# Patient Record
Sex: FEMALE | Race: BLACK OR AFRICAN AMERICAN | NOT HISPANIC OR LATINO | Employment: FULL TIME | URBAN - METROPOLITAN AREA
[De-identification: names, ages, dates, MRNs, and addresses within clinical notes are randomized per-mention and may not be internally consistent; named-entity substitution may affect disease eponyms.]

---

## 2023-09-03 LAB — PRENATAL STREP B CULTURE: NEGATIVE

## 2023-09-15 DIAGNOSIS — O48.0 POST-DATES PREGNANCY: Primary | ICD-10-CM

## 2023-09-20 ENCOUNTER — HOSPITAL ENCOUNTER (INPATIENT)
Facility: HOSPITAL | Age: 33
LOS: 3 days | Discharge: HOME | End: 2023-09-23
Attending: OBSTETRICS & GYNECOLOGY | Admitting: OBSTETRICS & GYNECOLOGY
Payer: COMMERCIAL

## 2023-09-20 ENCOUNTER — ANESTHESIA EVENT (OUTPATIENT)
Dept: OBSTETRICS AND GYNECOLOGY | Facility: HOSPITAL | Age: 33
End: 2023-09-20
Payer: COMMERCIAL

## 2023-09-20 ENCOUNTER — ANESTHESIA (OUTPATIENT)
Dept: OBSTETRICS AND GYNECOLOGY | Facility: HOSPITAL | Age: 33
End: 2023-09-20
Payer: COMMERCIAL

## 2023-09-20 VITALS — RESPIRATION RATE: 10 BRPM

## 2023-09-20 DIAGNOSIS — Z37.9 NORMAL LABOR: ICD-10-CM

## 2023-09-20 DIAGNOSIS — O61.9 FAILED INDUCTION OF LABOR: ICD-10-CM

## 2023-09-20 DIAGNOSIS — Z98.891 S/P CESAREAN SECTION: Primary | ICD-10-CM

## 2023-09-20 LAB
ALBUMIN SERPL-MCNC: 2.9 G/DL (ref 3.5–5)
ALBUMIN/GLOB SERPL: 0.9 RATIO (ref 1.1–2)
ALP SERPL-CCNC: 123 UNIT/L (ref 40–150)
ALT SERPL-CCNC: 11 UNIT/L (ref 0–55)
AST SERPL-CCNC: 16 UNIT/L (ref 5–34)
BASOPHILS # BLD AUTO: 0.04 X10(3)/MCL
BASOPHILS NFR BLD AUTO: 0.3 %
BILIRUB SERPL-MCNC: 0.8 MG/DL
BUN SERPL-MCNC: 5.1 MG/DL (ref 7–18.7)
CALCIUM SERPL-MCNC: 8.4 MG/DL (ref 8.4–10.2)
CHLORIDE SERPL-SCNC: 108 MMOL/L (ref 98–107)
CO2 SERPL-SCNC: 17 MMOL/L (ref 22–29)
CREAT SERPL-MCNC: 0.68 MG/DL (ref 0.55–1.02)
CTP QC/QA: YES
EOSINOPHIL # BLD AUTO: 0 X10(3)/MCL (ref 0–0.9)
EOSINOPHIL NFR BLD AUTO: 0 %
ERYTHROCYTE [DISTWIDTH] IN BLOOD BY AUTOMATED COUNT: 13 % (ref 11.5–17)
GFR SERPLBLD CREATININE-BSD FMLA CKD-EPI: >60 MLS/MIN/1.73/M2
GLOBULIN SER-MCNC: 3.4 GM/DL (ref 2.4–3.5)
GLUCOSE SERPL-MCNC: 82 MG/DL (ref 74–100)
GROUP & RH: NORMAL
HBV SURFACE AG SERPL QL IA: NONREACTIVE
HCT VFR BLD AUTO: 41 % (ref 37–47)
HGB BLD-MCNC: 14 G/DL (ref 12–16)
HIV 1+2 AB+HIV1 P24 AG SERPL QL IA: NONREACTIVE
IMM GRANULOCYTES # BLD AUTO: 0.06 X10(3)/MCL (ref 0–0.04)
IMM GRANULOCYTES NFR BLD AUTO: 0.4 %
INDIRECT COOMBS GEL: NORMAL
LYMPHOCYTES # BLD AUTO: 1.14 X10(3)/MCL (ref 0.6–4.6)
LYMPHOCYTES NFR BLD AUTO: 8.3 %
MCH RBC QN AUTO: 32.6 PG (ref 27–31)
MCHC RBC AUTO-ENTMCNC: 34.1 G/DL (ref 33–36)
MCV RBC AUTO: 95.6 FL (ref 80–94)
MONOCYTES # BLD AUTO: 0.54 X10(3)/MCL (ref 0.1–1.3)
MONOCYTES NFR BLD AUTO: 3.9 %
NEUTROPHILS # BLD AUTO: 12.01 X10(3)/MCL (ref 2.1–9.2)
NEUTROPHILS NFR BLD AUTO: 87.1 %
NRBC BLD AUTO-RTO: 0 %
PLATELET # BLD AUTO: 257 X10(3)/MCL (ref 130–400)
PMV BLD AUTO: 9.4 FL (ref 7.4–10.4)
POTASSIUM SERPL-SCNC: 3.6 MMOL/L (ref 3.5–5.1)
PROT SERPL-MCNC: 6.3 GM/DL (ref 6.4–8.3)
RBC # BLD AUTO: 4.29 X10(6)/MCL (ref 4.2–5.4)
RUPTURE OF MEMBRANE: POSITIVE
SODIUM SERPL-SCNC: 138 MMOL/L (ref 136–145)
SPECIMEN OUTDATE: NORMAL
T PALLIDUM AB SER QL: NONREACTIVE
WBC # SPEC AUTO: 13.79 X10(3)/MCL (ref 4.5–11.5)

## 2023-09-20 PROCEDURE — 85025 COMPLETE CBC W/AUTO DIFF WBC: CPT | Performed by: OBSTETRICS & GYNECOLOGY

## 2023-09-20 PROCEDURE — 25000003 PHARM REV CODE 250: Performed by: ANESTHESIOLOGY

## 2023-09-20 PROCEDURE — 87340 HEPATITIS B SURFACE AG IA: CPT | Performed by: OBSTETRICS & GYNECOLOGY

## 2023-09-20 PROCEDURE — 86780 TREPONEMA PALLIDUM: CPT | Performed by: OBSTETRICS & GYNECOLOGY

## 2023-09-20 PROCEDURE — 59514 PRA REAN DELIVERY ONLY: ICD-10-PCS | Mod: QX,CRNA,, | Performed by: NURSE ANESTHETIST, CERTIFIED REGISTERED

## 2023-09-20 PROCEDURE — 62326 NJX INTERLAMINAR LMBR/SAC: CPT | Performed by: ANESTHESIOLOGY

## 2023-09-20 PROCEDURE — 01968 ANES/ANALG CS DLVR NEURAXIAL: CPT | Mod: QX,CRNA,, | Performed by: NURSE ANESTHETIST, CERTIFIED REGISTERED

## 2023-09-20 PROCEDURE — 80053 COMPREHEN METABOLIC PANEL: CPT | Performed by: OBSTETRICS & GYNECOLOGY

## 2023-09-20 PROCEDURE — 01968 ANES/ANALG CS DLVR NEURAXIAL: CPT | Mod: QY,ANES,, | Performed by: ANESTHESIOLOGY

## 2023-09-20 PROCEDURE — 01968 PR INSERT CATH,ART,PERCUT,SHORTTERM: ICD-10-PCS | Mod: QY,ANES,, | Performed by: ANESTHESIOLOGY

## 2023-09-20 PROCEDURE — 86901 BLOOD TYPING SEROLOGIC RH(D): CPT | Performed by: OBSTETRICS & GYNECOLOGY

## 2023-09-20 PROCEDURE — 25000003 PHARM REV CODE 250

## 2023-09-20 PROCEDURE — 63600175 PHARM REV CODE 636 W HCPCS: Performed by: OBSTETRICS & GYNECOLOGY

## 2023-09-20 PROCEDURE — 01968 PR INSERT CATH,ART,PERCUT,SHORTTERM: ICD-10-PCS | Mod: QX,CRNA,, | Performed by: NURSE ANESTHETIST, CERTIFIED REGISTERED

## 2023-09-20 PROCEDURE — 87389 HIV-1 AG W/HIV-1&-2 AB AG IA: CPT | Performed by: OBSTETRICS & GYNECOLOGY

## 2023-09-20 PROCEDURE — 51702 INSERT TEMP BLADDER CATH: CPT

## 2023-09-20 PROCEDURE — 11000001 HC ACUTE MED/SURG PRIVATE ROOM

## 2023-09-20 PROCEDURE — 63600175 PHARM REV CODE 636 W HCPCS: Performed by: ANESTHESIOLOGY

## 2023-09-20 PROCEDURE — 25000003 PHARM REV CODE 250: Performed by: OBSTETRICS & GYNECOLOGY

## 2023-09-20 PROCEDURE — 59514 PRA REAN DELIVERY ONLY: ICD-10-PCS | Mod: AA,ANES,, | Performed by: ANESTHESIOLOGY

## 2023-09-20 PROCEDURE — 59514 CESAREAN DELIVERY ONLY: CPT | Mod: QX,CRNA,, | Performed by: NURSE ANESTHETIST, CERTIFIED REGISTERED

## 2023-09-20 PROCEDURE — 99285 EMERGENCY DEPT VISIT HI MDM: CPT

## 2023-09-20 PROCEDURE — 59514 CESAREAN DELIVERY ONLY: CPT | Mod: AA,ANES,, | Performed by: ANESTHESIOLOGY

## 2023-09-20 RX ORDER — OXYTOCIN/RINGER'S LACTATE 30/500 ML
334 PLASTIC BAG, INJECTION (ML) INTRAVENOUS ONCE
Status: DISCONTINUED | OUTPATIENT
Start: 2023-09-20 | End: 2023-09-21

## 2023-09-20 RX ORDER — CALCIUM CARBONATE 200(500)MG
500 TABLET,CHEWABLE ORAL 3 TIMES DAILY PRN
Status: DISCONTINUED | OUTPATIENT
Start: 2023-09-20 | End: 2023-09-21

## 2023-09-20 RX ORDER — GENTAMICIN SULFATE 80 MG/50ML
80 INJECTION, SOLUTION INTRAVENOUS
Status: DISCONTINUED | OUTPATIENT
Start: 2023-09-20 | End: 2023-09-20

## 2023-09-20 RX ORDER — OXYTOCIN/RINGER'S LACTATE 30/500 ML
0-30 PLASTIC BAG, INJECTION (ML) INTRAVENOUS CONTINUOUS
Status: DISCONTINUED | OUTPATIENT
Start: 2023-09-20 | End: 2023-09-21

## 2023-09-20 RX ORDER — OXYTOCIN/RINGER'S LACTATE 30/500 ML
95 PLASTIC BAG, INJECTION (ML) INTRAVENOUS ONCE AS NEEDED
Status: COMPLETED | OUTPATIENT
Start: 2023-09-20 | End: 2023-09-21

## 2023-09-20 RX ORDER — EPHEDRINE SULFATE 50 MG/ML
INJECTION, SOLUTION INTRAVENOUS
Status: COMPLETED
Start: 2023-09-20 | End: 2023-09-20

## 2023-09-20 RX ORDER — OXYTOCIN 10 [USP'U]/ML
10 INJECTION, SOLUTION INTRAMUSCULAR; INTRAVENOUS ONCE AS NEEDED
Status: DISCONTINUED | OUTPATIENT
Start: 2023-09-20 | End: 2023-09-21

## 2023-09-20 RX ORDER — GENTAMICIN SULFATE 80 MG/50ML
80 INJECTION, SOLUTION INTRAVENOUS
Status: DISCONTINUED | OUTPATIENT
Start: 2023-09-20 | End: 2023-09-21

## 2023-09-20 RX ORDER — LIDOCAINE HYDROCHLORIDE 10 MG/ML
10 INJECTION INFILTRATION; PERINEURAL ONCE AS NEEDED
Status: DISCONTINUED | OUTPATIENT
Start: 2023-09-20 | End: 2023-09-21

## 2023-09-20 RX ORDER — ONDANSETRON 4 MG/1
8 TABLET, ORALLY DISINTEGRATING ORAL EVERY 8 HOURS PRN
Status: DISCONTINUED | OUTPATIENT
Start: 2023-09-20 | End: 2023-09-21

## 2023-09-20 RX ORDER — PROCHLORPERAZINE EDISYLATE 5 MG/ML
5 INJECTION INTRAMUSCULAR; INTRAVENOUS EVERY 6 HOURS PRN
Status: DISCONTINUED | OUTPATIENT
Start: 2023-09-20 | End: 2023-09-21

## 2023-09-20 RX ORDER — OXYTOCIN/RINGER'S LACTATE 30/500 ML
334 PLASTIC BAG, INJECTION (ML) INTRAVENOUS ONCE AS NEEDED
Status: DISCONTINUED | OUTPATIENT
Start: 2023-09-20 | End: 2023-09-21

## 2023-09-20 RX ORDER — METHYLERGONOVINE MALEATE 0.2 MG/ML
200 INJECTION INTRAVENOUS
Status: DISCONTINUED | OUTPATIENT
Start: 2023-09-20 | End: 2023-09-21

## 2023-09-20 RX ORDER — SODIUM CHLORIDE, SODIUM LACTATE, POTASSIUM CHLORIDE, CALCIUM CHLORIDE 600; 310; 30; 20 MG/100ML; MG/100ML; MG/100ML; MG/100ML
INJECTION, SOLUTION INTRAVENOUS CONTINUOUS
Status: DISCONTINUED | OUTPATIENT
Start: 2023-09-20 | End: 2023-09-21

## 2023-09-20 RX ORDER — FENTANYL/BUPIVACAINE/NS/PF 2-1250MCG
PLASTIC BAG, INJECTION (ML) INJECTION CONTINUOUS
Status: DISCONTINUED | OUTPATIENT
Start: 2023-09-20 | End: 2023-09-21

## 2023-09-20 RX ORDER — OXYTOCIN/RINGER'S LACTATE 30/500 ML
95 PLASTIC BAG, INJECTION (ML) INTRAVENOUS ONCE
Status: DISCONTINUED | OUTPATIENT
Start: 2023-09-20 | End: 2023-09-21

## 2023-09-20 RX ORDER — FAMOTIDINE 10 MG/ML
20 INJECTION INTRAVENOUS ONCE
Status: DISCONTINUED | OUTPATIENT
Start: 2023-09-20 | End: 2023-09-21

## 2023-09-20 RX ORDER — SODIUM CHLORIDE 9 MG/ML
INJECTION, SOLUTION INTRAVENOUS
Status: DISCONTINUED | OUTPATIENT
Start: 2023-09-20 | End: 2023-09-21

## 2023-09-20 RX ORDER — EPHEDRINE SULFATE 50 MG/ML
10 INJECTION, SOLUTION INTRAVENOUS ONCE AS NEEDED
Status: COMPLETED | OUTPATIENT
Start: 2023-09-20 | End: 2023-09-20

## 2023-09-20 RX ORDER — MISOPROSTOL 100 UG/1
800 TABLET ORAL ONCE AS NEEDED
Status: DISCONTINUED | OUTPATIENT
Start: 2023-09-20 | End: 2023-09-21

## 2023-09-20 RX ORDER — SIMETHICONE 80 MG
1 TABLET,CHEWABLE ORAL 4 TIMES DAILY PRN
Status: DISCONTINUED | OUTPATIENT
Start: 2023-09-20 | End: 2023-09-21

## 2023-09-20 RX ORDER — BUPIVACAINE HYDROCHLORIDE 2.5 MG/ML
INJECTION, SOLUTION EPIDURAL; INFILTRATION; INTRACAUDAL
Status: COMPLETED | OUTPATIENT
Start: 2023-09-20 | End: 2023-09-20

## 2023-09-20 RX ORDER — CARBOPROST TROMETHAMINE 250 UG/ML
250 INJECTION, SOLUTION INTRAMUSCULAR
Status: DISCONTINUED | OUTPATIENT
Start: 2023-09-20 | End: 2023-09-21

## 2023-09-20 RX ORDER — SODIUM CITRATE AND CITRIC ACID MONOHYDRATE 334; 500 MG/5ML; MG/5ML
30 SOLUTION ORAL ONCE
Status: COMPLETED | OUTPATIENT
Start: 2023-09-20 | End: 2023-09-20

## 2023-09-20 RX ORDER — DIPHENOXYLATE HYDROCHLORIDE AND ATROPINE SULFATE 2.5; .025 MG/1; MG/1
2 TABLET ORAL EVERY 6 HOURS PRN
Status: DISCONTINUED | OUTPATIENT
Start: 2023-09-20 | End: 2023-09-21

## 2023-09-20 RX ADMIN — SODIUM CITRATE AND CITRIC ACID MONOHYDRATE 30 ML: 500; 334 SOLUTION ORAL at 04:09

## 2023-09-20 RX ADMIN — Medication 10 ML/HR: at 04:09

## 2023-09-20 RX ADMIN — AMPICILLIN SODIUM 2 G: 2 INJECTION, POWDER, FOR SOLUTION INTRAMUSCULAR; INTRAVENOUS at 07:09

## 2023-09-20 RX ADMIN — EPHEDRINE SULFATE: 50 INJECTION INTRAVENOUS at 06:09

## 2023-09-20 RX ADMIN — Medication 2 MILLI-UNITS/MIN: at 08:09

## 2023-09-20 RX ADMIN — EPHEDRINE SULFATE 10 MG: 50 INJECTION INTRAVENOUS at 05:09

## 2023-09-20 RX ADMIN — SODIUM CHLORIDE, POTASSIUM CHLORIDE, SODIUM LACTATE AND CALCIUM CHLORIDE 500 ML: 600; 310; 30; 20 INJECTION, SOLUTION INTRAVENOUS at 11:09

## 2023-09-20 RX ADMIN — Medication 10 ML/HR: at 05:09

## 2023-09-20 RX ADMIN — SODIUM CHLORIDE, POTASSIUM CHLORIDE, SODIUM LACTATE AND CALCIUM CHLORIDE: 600; 310; 30; 20 INJECTION, SOLUTION INTRAVENOUS at 09:09

## 2023-09-20 RX ADMIN — GENTAMICIN SULFATE 80 MG: 80 INJECTION, SOLUTION INTRAVENOUS at 09:09

## 2023-09-20 RX ADMIN — EPHEDRINE SULFATE 10 MG: 50 INJECTION, SOLUTION INTRAVENOUS at 05:09

## 2023-09-20 RX ADMIN — SODIUM CHLORIDE, POTASSIUM CHLORIDE, SODIUM LACTATE AND CALCIUM CHLORIDE: 600; 310; 30; 20 INJECTION, SOLUTION INTRAVENOUS at 06:09

## 2023-09-20 RX ADMIN — BUPIVACAINE HYDROCHLORIDE 8 ML: 2.5 INJECTION, SOLUTION EPIDURAL; INFILTRATION; INTRACAUDAL; PERINEURAL at 04:09

## 2023-09-20 NOTE — H&P
"LnD HnP     Admit Date: 2023  RAJANI Physician: Anson Chua  Primary OBGYN: No Attending/OB Hospitalist Group Admit    Admit Diagnosis/Chief Complaint: Leakage of Fluid and Contractions      Chief Complaint   Patient presents with    Abdominal Pain       Hospital Course:  Jade Berg is a 33 y.o.  at 40w3d presents from the birth house she is been laboring and ruptured since 1315 yesterday.  States good fetal movement  This IUP is complicated by denies any complication no medication outside of prenatal vitamins denies history of or recent STI denies lesions or prodrome of symptoms.    Patient denies vaginal bleeding.  Fetal Movement: normal.    Temp 99 °F (37.2 °C) (Oral)   Resp 20   Ht 5' 3" (1.6 m)   Wt 72.6 kg (160 lb)   BMI 28.34 kg/m²   Temp:  [99 °F (37.2 °C)] 99 °F (37.2 °C)  Resp:  [20] 20    General: in no apparent distress  Abdominal: soft, nontender, nondistended, no abnormal masses, no epigastric pain FHT present  Back: lumbar tenderness absent   CVA tenderness none  Extremeties no redness or tenderness in the calves or thighs no edema    SSE:   SVE:SVE (PeriWATCH)  Dilation (cm): 4  Effacement (%): 80  Station: -2  Examined by:: THEO Agustin RN  Simplified Harris Score: 6     FHT:  Reactive  TOCO: Contractions every 5-8 min      LABS:     Recent Results (from the past 24 hour(s))   POCT Rupture of membrane    Collection Time: 23  3:05 PM   Result Value Ref Range    Rupture of Membrane Positive (A) Negative     Acceptable Yes      [unfilled]     Imaging Results    None          ASSESMENT: Jade Berg is a 33 y.o.   at 40w3d regina in labor  Admit to Labor and delivery   IV fluids   No signs of endometritis CBC pending  Suspected meconium on ROM plus examination discussed difficulties with delayed cord clamping in the setting of meconium  Patient was given opportunity to ask questions  Epidural as needed   Admission labs   Discussed " with private physician or on-call physician      Discharge Diagnosis/Clinical Impression**: Normal labor   Status:Stable                This note was created with the assistance of ST[employer] voice recognition software. There may be transcription errors as a result of using this technology however minimal. Effort has been made to assure accuracy of transcription but any obvious errors or omissions should be clarified with the author of the document.

## 2023-09-20 NOTE — ED TRIAGE NOTES
IUP 40.3 pt presents from [facility]. Pt states she has jacki regina since last evening. Pt reported to [facility] this morning around 0900. Midwife is present and states the pt is 4cm dilated. Pt reports leakings jackelin 1315 yesterday afternoon. Pt reports good fetal movement.

## 2023-09-20 NOTE — ANESTHESIA PROCEDURE NOTES
Epidural    Patient location during procedure: OB   Reason for block: primary anesthetic   Reason for block: at surgeon's request, labor analgesia requested by patient and obstetrician   Start time: 9/20/2023 4:41 PM  Timeout: 9/20/2023 4:41 PM  End time: 9/20/2023 4:59 PM    Staffing  Performing Provider: Cachorro Herbert DO  Authorizing Provider: Cachorro Herbert DO    Staffing  Performed by: Cachorro Herbert DO  Authorized by: Cachorro Herbert DO        Preanesthetic Checklist  Completed: patient identified, IV checked, site marked, risks and benefits discussed, surgical consent, monitors and equipment checked, pre-op evaluation, timeout performed, anesthesia consent given, hand hygiene performed and patient being monitored  Preparation  Patient position: sitting  Prep: ChloraPrep  Patient monitoring: ECG, Pulse Ox and Blood Pressure  Reason for block: primary anesthetic   Epidural  Skin Anesthetic: lidocaine 1%  Skin Wheal: 1 mL  Administration type: single shot  Approach: midline  Interspace: L3-4    Injection technique: MENDEL air and MENDEL saline  Needle and Epidural Catheter  Needle type: Tuohy   Needle gauge: 17  Needle length: 3.5 inches  Catheter type: multi-orifice  Catheter size: 20 G  Insertion Attempts: 1  Test dose: 5 mL of lidocaine 1.5% with Epi 1-to-200,000  Additional Documentation: incremental injection, no paresthesia on injection, no significant pain on injection, negative aspiration for heme and CSF, no signs/symptoms of IV or SA injection and no significant complaints from patient  Needle localization: anatomical landmarks  Assessment  Ease of block: moderate  Patient's tolerance of the procedure: comfortable throughout block and no complaints No inadvertent dural puncture with Tuohy.  Dural puncture not performed with spinal needle    Medications:    Medications: bupivacaine (pf) (MARCAINE) injection 0.25% - Epidural   8 mL - 9/20/2023 4:58:00 PM

## 2023-09-20 NOTE — ANESTHESIA PREPROCEDURE EVALUATION
09/20/2023  Jade Berg is a 33 y.o., female.      Pre-op Assessment    I have reviewed the Patient Summary Reports.     I have reviewed the Nursing Notes. I have reviewed the NPO Status.   I have reviewed the Medications.     Review of Systems  Anesthesia Hx:  No problems with previous Anesthesia    Social:  Non-Smoker    Cardiovascular:   Denies Hypertension.  Denies Valvular problems/Murmurs.     Pulmonary:   Denies COPD.  Denies Asthma.    Renal/:   Denies Chronic Renal Disease.     Hepatic/GI:   Denies GERD. Denies Liver Disease.  Denies Hepatitis.    OB/GYN/PEDS:  Planned Vaginal Delivery 40 weeks    Neurological:   Denies Seizures.    Endocrine:   Denies Diabetes. Denies Hypothyroidism. Denies Hyperthyroidism.  Denies Obesity / BMI > 30      Physical Exam  General: Well nourished, Cooperative, Alert and Oriented    Airway:  Mallampati: I   Mouth Opening: Normal  TM Distance: Normal  Tongue: Normal  Neck ROM: Normal ROM    Dental:  Intact        Anesthesia Plan  Type of Anesthesia, risks & benefits discussed:    Anesthesia Type: Epidural  Intra-op Monitoring Plan: Standard ASA Monitors  Informed Consent: Informed consent signed with the Patient and all parties understand the risks and agree with anesthesia plan.  All questions answered.   ASA Score: 2  Day of Surgery Review of History & Physical: H&P Update referred to the surgeon/provider.    Ready For Surgery From Anesthesia Perspective.     .

## 2023-09-20 NOTE — ED PROVIDER NOTES
"RAJANI NOTE     Admit Date: 2023  RAJANI Physician: Anson Chua  Primary OBGYN: No Attending/OB Hospitalist Group Admit    Admit Diagnosis/Chief Complaint: Leakage of Fluid and Contractions      Chief Complaint   Patient presents with    Abdominal Pain       Hospital Course:  Jade Berg is a 33 y.o.  at 40w3d presents from the birth house she is been laboring and ruptured since 1315 yesterday.  States good fetal movement  This IUP is complicated by denies any complication no medication outside of prenatal vitamins denies history of or recent STI denies lesions or prodrome of symptoms.    Patient denies vaginal bleeding.  Fetal Movement: normal.    Temp 99 °F (37.2 °C) (Oral)   Resp 20   Ht 5' 3" (1.6 m)   Wt 72.6 kg (160 lb)   BMI 28.34 kg/m²   Temp:  [99 °F (37.2 °C)] 99 °F (37.2 °C)  Resp:  [20] 20    General: in no apparent distress  Abdominal: soft, nontender, nondistended, no abnormal masses, no epigastric pain FHT present  Back: lumbar tenderness absent   CVA tenderness none  Extremeties no redness or tenderness in the calves or thighs no edema    SSE:   SVE:SVE (PeriWATCH)  Dilation (cm): 4  Effacement (%): 80  Station: -2  Examined by:: THEO Agustin RN  Simplified Harris Score: 6     FHT:  Reactive  TOCO: Contractions every 5-8 min      LABS:     Recent Results (from the past 24 hour(s))   POCT Rupture of membrane    Collection Time: 23  3:05 PM   Result Value Ref Range    Rupture of Membrane Positive (A) Negative     Acceptable Yes      [unfilled]     Imaging Results    None          ASSESMENT: Jade Berg is a 33 y.o.   at 40w3d regina in labor  Admit to Labor and delivery   IV fluids   No signs of endometritis CBC pending  Suspected meconium on ROM plus examination discussed difficulties with delayed cord clamping in the setting of meconium  Patient was given opportunity to ask questions  Epidural as needed   Admission labs   Discussed " with private physician or on-call physician      Discharge Diagnosis/Clinical Impression**: Normal labor   Status:Stable                This note was created with the assistance of [employer] voice recognition software. There may be transcription errors as a result of using this technology however minimal. Effort has been made to assure accuracy of transcription but any obvious errors or omissions should be clarified with the author of the document.

## 2023-09-21 PROBLEM — Z98.891 S/P CESAREAN SECTION: Status: ACTIVE | Noted: 2023-09-21

## 2023-09-21 LAB
BASE EXCESS BLD CALC-SCNC: -0.8 MMOL/L
BASE EXCESS BLD CALC-SCNC: -2 MMOL/L
BLOOD GAS SAMPLE TYPE (OHS): ABNORMAL
BLOOD GAS SAMPLE TYPE (OHS): NORMAL
CA-I BLD-SCNC: 1.32 MMOL/L (ref 1.12–1.32)
CA-I BLD-SCNC: 1.36 MMOL/L (ref 1.12–1.32)
CO2 BLDA-SCNC: 27.8 MMOL/L
CO2 BLDA-SCNC: 28.9 MMOL/L
DRAWN BY BLOOD GAS (OHS): ABNORMAL
DRAWN BY BLOOD GAS (OHS): NORMAL
HCO3 BLDA-SCNC: 26 MMOL/L
HCO3 BLDA-SCNC: 27.1 MMOL/L
PCO2 BLDA: 58 MMHG
PCO2 BLDA: 59 MMHG
PH BLDA: 7.26 [PH]
PH BLDA: 7.27 [PH]
PO2 BLDA: <38 MMHG
PO2 BLDA: <38 MMHG
POTASSIUM BLOOD GAS (OHS): 4.9 MMOL/L
POTASSIUM BLOOD GAS (OHS): 5.2 MMOL/L
SAO2 % BLDA: 3 %
SAO2 % BLDA: 5 %
SODIUM BLOOD GAS (OHS): 135 MMOL/L
SODIUM BLOOD GAS (OHS): 135 MMOL/L

## 2023-09-21 PROCEDURE — 63600175 PHARM REV CODE 636 W HCPCS

## 2023-09-21 PROCEDURE — 99900035 HC TECH TIME PER 15 MIN (STAT)

## 2023-09-21 PROCEDURE — 72100003 HC LABOR CARE, EA. ADDL. 8 HRS

## 2023-09-21 PROCEDURE — 25000003 PHARM REV CODE 250

## 2023-09-21 PROCEDURE — 37000008 HC ANESTHESIA 1ST 15 MINUTES: Performed by: OBSTETRICS & GYNECOLOGY

## 2023-09-21 PROCEDURE — 37000009 HC ANESTHESIA EA ADD 15 MINS: Performed by: OBSTETRICS & GYNECOLOGY

## 2023-09-21 PROCEDURE — 36004725 HC OB OR TIME LEV III - EA ADD 15 MIN: Performed by: OBSTETRICS & GYNECOLOGY

## 2023-09-21 PROCEDURE — 11000001 HC ACUTE MED/SURG PRIVATE ROOM

## 2023-09-21 PROCEDURE — 25000003 PHARM REV CODE 250: Performed by: NURSE ANESTHETIST, CERTIFIED REGISTERED

## 2023-09-21 PROCEDURE — 63600175 PHARM REV CODE 636 W HCPCS: Performed by: OBSTETRICS & GYNECOLOGY

## 2023-09-21 PROCEDURE — 72100002 HC LABOR CARE, 1ST 8 HOURS

## 2023-09-21 PROCEDURE — 36416 COLLJ CAPILLARY BLOOD SPEC: CPT

## 2023-09-21 PROCEDURE — 63600175 PHARM REV CODE 636 W HCPCS: Performed by: NURSE ANESTHETIST, CERTIFIED REGISTERED

## 2023-09-21 PROCEDURE — 25000003 PHARM REV CODE 250: Performed by: OBSTETRICS & GYNECOLOGY

## 2023-09-21 PROCEDURE — 71000033 HC RECOVERY, INTIAL HOUR: Performed by: OBSTETRICS & GYNECOLOGY

## 2023-09-21 PROCEDURE — 36004724 HC OB OR TIME LEV III - 1ST 15 MIN: Performed by: OBSTETRICS & GYNECOLOGY

## 2023-09-21 PROCEDURE — 27000492 HC SLEEVE, SCD T/L

## 2023-09-21 PROCEDURE — 82803 BLOOD GASES ANY COMBINATION: CPT

## 2023-09-21 RX ORDER — DIPHENOXYLATE HYDROCHLORIDE AND ATROPINE SULFATE 2.5; .025 MG/1; MG/1
2 TABLET ORAL EVERY 6 HOURS PRN
Status: DISCONTINUED | OUTPATIENT
Start: 2023-09-21 | End: 2023-09-23 | Stop reason: HOSPADM

## 2023-09-21 RX ORDER — MORPHINE SULFATE 1 MG/ML
INJECTION, SOLUTION EPIDURAL; INTRATHECAL; INTRAVENOUS
Status: DISCONTINUED | OUTPATIENT
Start: 2023-09-21 | End: 2023-09-21

## 2023-09-21 RX ORDER — PRENATAL WITH FERROUS FUM AND FOLIC ACID 3080; 920; 120; 400; 22; 1.84; 3; 20; 10; 1; 12; 200; 27; 25; 2 [IU]/1; [IU]/1; MG/1; [IU]/1; MG/1; MG/1; MG/1; MG/1; MG/1; MG/1; UG/1; MG/1; MG/1; MG/1; MG/1
1 TABLET ORAL DAILY
Status: DISCONTINUED | OUTPATIENT
Start: 2023-09-21 | End: 2023-09-23 | Stop reason: HOSPADM

## 2023-09-21 RX ORDER — DIPHENHYDRAMINE HCL 25 MG
25 CAPSULE ORAL EVERY 4 HOURS PRN
Status: DISCONTINUED | OUTPATIENT
Start: 2023-09-21 | End: 2023-09-23 | Stop reason: HOSPADM

## 2023-09-21 RX ORDER — PROCHLORPERAZINE EDISYLATE 5 MG/ML
5 INJECTION INTRAMUSCULAR; INTRAVENOUS EVERY 6 HOURS PRN
Status: DISCONTINUED | OUTPATIENT
Start: 2023-09-21 | End: 2023-09-23 | Stop reason: HOSPADM

## 2023-09-21 RX ORDER — LIDOCAINE HYDROCHLORIDE 20 MG/ML
INJECTION, SOLUTION EPIDURAL; INFILTRATION; INTRACAUDAL; PERINEURAL
Status: DISCONTINUED | OUTPATIENT
Start: 2023-09-21 | End: 2023-09-21

## 2023-09-21 RX ORDER — MORPHINE SULFATE 4 MG/ML
4 INJECTION, SOLUTION INTRAMUSCULAR; INTRAVENOUS EVERY 4 HOURS PRN
Status: DISCONTINUED | OUTPATIENT
Start: 2023-09-21 | End: 2023-09-23 | Stop reason: HOSPADM

## 2023-09-21 RX ORDER — AMOXICILLIN 250 MG
1 CAPSULE ORAL NIGHTLY PRN
Status: DISCONTINUED | OUTPATIENT
Start: 2023-09-21 | End: 2023-09-23 | Stop reason: HOSPADM

## 2023-09-21 RX ORDER — DEXTROSE, SODIUM CHLORIDE, SODIUM LACTATE, POTASSIUM CHLORIDE, AND CALCIUM CHLORIDE 5; .6; .31; .03; .02 G/100ML; G/100ML; G/100ML; G/100ML; G/100ML
INJECTION, SOLUTION INTRAVENOUS CONTINUOUS
Status: DISCONTINUED | OUTPATIENT
Start: 2023-09-21 | End: 2023-09-23 | Stop reason: HOSPADM

## 2023-09-21 RX ORDER — KETOROLAC TROMETHAMINE 30 MG/ML
30 INJECTION, SOLUTION INTRAMUSCULAR; INTRAVENOUS EVERY 8 HOURS
Status: DISPENSED | OUTPATIENT
Start: 2023-09-21 | End: 2023-09-22

## 2023-09-21 RX ORDER — SODIUM CHLORIDE 0.9 % (FLUSH) 0.9 %
10 SYRINGE (ML) INJECTION
Status: DISCONTINUED | OUTPATIENT
Start: 2023-09-21 | End: 2023-09-23 | Stop reason: HOSPADM

## 2023-09-21 RX ORDER — MORPHINE SULFATE 0.5 MG/ML
INJECTION, SOLUTION EPIDURAL; INTRATHECAL; INTRAVENOUS
Status: DISCONTINUED | OUTPATIENT
Start: 2023-09-21 | End: 2023-09-21

## 2023-09-21 RX ORDER — OXYTOCIN 10 [USP'U]/ML
10 INJECTION, SOLUTION INTRAMUSCULAR; INTRAVENOUS ONCE AS NEEDED
Status: DISCONTINUED | OUTPATIENT
Start: 2023-09-21 | End: 2023-09-23 | Stop reason: HOSPADM

## 2023-09-21 RX ORDER — ONDANSETRON 2 MG/ML
4 INJECTION INTRAMUSCULAR; INTRAVENOUS EVERY 6 HOURS PRN
Status: DISCONTINUED | OUTPATIENT
Start: 2023-09-21 | End: 2023-09-23 | Stop reason: HOSPADM

## 2023-09-21 RX ORDER — OXYCODONE AND ACETAMINOPHEN 5; 325 MG/1; MG/1
1 TABLET ORAL EVERY 4 HOURS PRN
Status: DISCONTINUED | OUTPATIENT
Start: 2023-09-21 | End: 2023-09-23 | Stop reason: HOSPADM

## 2023-09-21 RX ORDER — SIMETHICONE 80 MG
1 TABLET,CHEWABLE ORAL EVERY 6 HOURS PRN
Status: DISCONTINUED | OUTPATIENT
Start: 2023-09-21 | End: 2023-09-23 | Stop reason: HOSPADM

## 2023-09-21 RX ORDER — ADHESIVE BANDAGE
30 BANDAGE TOPICAL 2 TIMES DAILY PRN
Status: DISCONTINUED | OUTPATIENT
Start: 2023-09-22 | End: 2023-09-23 | Stop reason: HOSPADM

## 2023-09-21 RX ORDER — ONDANSETRON 2 MG/ML
INJECTION INTRAMUSCULAR; INTRAVENOUS
Status: DISCONTINUED | OUTPATIENT
Start: 2023-09-21 | End: 2023-09-21

## 2023-09-21 RX ORDER — OXYCODONE AND ACETAMINOPHEN 10; 325 MG/1; MG/1
1 TABLET ORAL EVERY 4 HOURS PRN
Status: DISCONTINUED | OUTPATIENT
Start: 2023-09-21 | End: 2023-09-23 | Stop reason: HOSPADM

## 2023-09-21 RX ORDER — METHYLERGONOVINE MALEATE 0.2 MG/ML
200 INJECTION INTRAVENOUS
Status: DISCONTINUED | OUTPATIENT
Start: 2023-09-21 | End: 2023-09-23 | Stop reason: HOSPADM

## 2023-09-21 RX ORDER — MORPHINE SULFATE 10 MG/ML
10 INJECTION INTRAMUSCULAR; INTRAVENOUS; SUBCUTANEOUS EVERY 4 HOURS PRN
Status: DISCONTINUED | OUTPATIENT
Start: 2023-09-21 | End: 2023-09-23 | Stop reason: HOSPADM

## 2023-09-21 RX ORDER — ONDANSETRON 4 MG/1
8 TABLET, ORALLY DISINTEGRATING ORAL EVERY 8 HOURS PRN
Status: DISCONTINUED | OUTPATIENT
Start: 2023-09-21 | End: 2023-09-23 | Stop reason: HOSPADM

## 2023-09-21 RX ORDER — OXYTOCIN/RINGER'S LACTATE 30/500 ML
95 PLASTIC BAG, INJECTION (ML) INTRAVENOUS ONCE AS NEEDED
Status: DISCONTINUED | OUTPATIENT
Start: 2023-09-21 | End: 2023-09-23 | Stop reason: HOSPADM

## 2023-09-21 RX ORDER — FENTANYL CITRATE 50 UG/ML
INJECTION, SOLUTION INTRAMUSCULAR; INTRAVENOUS
Status: DISCONTINUED | OUTPATIENT
Start: 2023-09-21 | End: 2023-09-21

## 2023-09-21 RX ORDER — DOCUSATE SODIUM 100 MG/1
200 CAPSULE, LIQUID FILLED ORAL 2 TIMES DAILY
Status: DISCONTINUED | OUTPATIENT
Start: 2023-09-21 | End: 2023-09-23 | Stop reason: HOSPADM

## 2023-09-21 RX ORDER — KETAMINE HYDROCHLORIDE 100 MG/ML
INJECTION, SOLUTION INTRAMUSCULAR; INTRAVENOUS
Status: DISCONTINUED | OUTPATIENT
Start: 2023-09-21 | End: 2023-09-21

## 2023-09-21 RX ORDER — MUPIROCIN 20 MG/G
OINTMENT TOPICAL 2 TIMES DAILY
Status: DISCONTINUED | OUTPATIENT
Start: 2023-09-21 | End: 2023-09-23 | Stop reason: HOSPADM

## 2023-09-21 RX ORDER — MIDAZOLAM HYDROCHLORIDE 1 MG/ML
INJECTION INTRAMUSCULAR; INTRAVENOUS
Status: DISCONTINUED | OUTPATIENT
Start: 2023-09-21 | End: 2023-09-21

## 2023-09-21 RX ORDER — MUPIROCIN 20 MG/G
OINTMENT TOPICAL
Status: DISCONTINUED | OUTPATIENT
Start: 2023-09-21 | End: 2023-09-21

## 2023-09-21 RX ORDER — KETOROLAC TROMETHAMINE 30 MG/ML
INJECTION, SOLUTION INTRAMUSCULAR; INTRAVENOUS
Status: DISCONTINUED | OUTPATIENT
Start: 2023-09-21 | End: 2023-09-21

## 2023-09-21 RX ORDER — OXYTOCIN/RINGER'S LACTATE 30/500 ML
334 PLASTIC BAG, INJECTION (ML) INTRAVENOUS ONCE AS NEEDED
Status: DISCONTINUED | OUTPATIENT
Start: 2023-09-21 | End: 2023-09-23 | Stop reason: HOSPADM

## 2023-09-21 RX ORDER — OXYTOCIN/RINGER'S LACTATE 30/500 ML
95 PLASTIC BAG, INJECTION (ML) INTRAVENOUS ONCE
Status: COMPLETED | OUTPATIENT
Start: 2023-09-21 | End: 2023-09-21

## 2023-09-21 RX ORDER — BUPIVACAINE HYDROCHLORIDE 5 MG/ML
INJECTION, SOLUTION EPIDURAL; INTRACAUDAL
Status: DISCONTINUED | OUTPATIENT
Start: 2023-09-21 | End: 2023-09-21

## 2023-09-21 RX ORDER — MISOPROSTOL 100 UG/1
800 TABLET ORAL ONCE AS NEEDED
Status: DISCONTINUED | OUTPATIENT
Start: 2023-09-21 | End: 2023-09-23 | Stop reason: HOSPADM

## 2023-09-21 RX ORDER — IBUPROFEN 800 MG/1
800 TABLET ORAL EVERY 8 HOURS
Status: DISCONTINUED | OUTPATIENT
Start: 2023-09-22 | End: 2023-09-23 | Stop reason: HOSPADM

## 2023-09-21 RX ORDER — ACETAMINOPHEN 10 MG/ML
INJECTION, SOLUTION INTRAVENOUS
Status: DISCONTINUED | OUTPATIENT
Start: 2023-09-21 | End: 2023-09-21

## 2023-09-21 RX ORDER — BISACODYL 10 MG
10 SUPPOSITORY, RECTAL RECTAL ONCE AS NEEDED
Status: DISCONTINUED | OUTPATIENT
Start: 2023-09-21 | End: 2023-09-23 | Stop reason: HOSPADM

## 2023-09-21 RX ORDER — CARBOPROST TROMETHAMINE 250 UG/ML
250 INJECTION, SOLUTION INTRAMUSCULAR
Status: DISCONTINUED | OUTPATIENT
Start: 2023-09-21 | End: 2023-09-23 | Stop reason: HOSPADM

## 2023-09-21 RX ORDER — GLYCOPYRROLATE 0.2 MG/ML
INJECTION INTRAMUSCULAR; INTRAVENOUS
Status: DISCONTINUED | OUTPATIENT
Start: 2023-09-21 | End: 2023-09-21

## 2023-09-21 RX ADMIN — MORPHINE SULFATE 5 MG: 1 INJECTION, SOLUTION EPIDURAL; INTRATHECAL; INTRAVENOUS at 10:09

## 2023-09-21 RX ADMIN — MORPHINE SULFATE 4 MG: 4 INJECTION INTRAVENOUS at 01:09

## 2023-09-21 RX ADMIN — MORPHINE SULFATE 3 MG: 0.5 INJECTION, SOLUTION EPIDURAL; INTRATHECAL; INTRAVENOUS at 10:09

## 2023-09-21 RX ADMIN — LIDOCAINE HYDROCHLORIDE 5 ML: 20 INJECTION, SOLUTION EPIDURAL; INFILTRATION; INTRACAUDAL; PERINEURAL at 09:09

## 2023-09-21 RX ADMIN — SODIUM CHLORIDE, SODIUM LACTATE, POTASSIUM CHLORIDE, CALCIUM CHLORIDE AND DEXTROSE MONOHYDRATE: 5; 600; 310; 30; 20 INJECTION, SOLUTION INTRAVENOUS at 01:09

## 2023-09-21 RX ADMIN — AZITHROMYCIN 500 MG: 500 INJECTION, POWDER, LYOPHILIZED, FOR SOLUTION INTRAVENOUS at 09:09

## 2023-09-21 RX ADMIN — Medication 95 MILLI-UNITS/MIN: at 11:09

## 2023-09-21 RX ADMIN — ONDANSETRON 4 MG: 2 INJECTION INTRAMUSCULAR; INTRAVENOUS at 09:09

## 2023-09-21 RX ADMIN — FENTANYL CITRATE 100 MCG: 50 INJECTION, SOLUTION INTRAMUSCULAR; INTRAVENOUS at 08:09

## 2023-09-21 RX ADMIN — Medication 500 ML/HR: at 10:09

## 2023-09-21 RX ADMIN — ONDANSETRON 4 MG: 2 INJECTION INTRAMUSCULAR; INTRAVENOUS at 04:09

## 2023-09-21 RX ADMIN — LIDOCAINE HYDROCHLORIDE 5 ML: 20 INJECTION, SOLUTION EPIDURAL; INFILTRATION; INTRACAUDAL; PERINEURAL at 08:09

## 2023-09-21 RX ADMIN — KETOROLAC TROMETHAMINE 30 MG: 30 INJECTION, SOLUTION INTRAMUSCULAR at 06:09

## 2023-09-21 RX ADMIN — SODIUM CHLORIDE, POTASSIUM CHLORIDE, SODIUM LACTATE AND CALCIUM CHLORIDE: 600; 310; 30; 20 INJECTION, SOLUTION INTRAVENOUS at 03:09

## 2023-09-21 RX ADMIN — GLYCOPYRROLATE 0.2 MG: 0.2 INJECTION INTRAMUSCULAR; INTRAVENOUS at 09:09

## 2023-09-21 RX ADMIN — ACETAMINOPHEN 1000 MG: 10 INJECTION, SOLUTION INTRAVENOUS at 10:09

## 2023-09-21 RX ADMIN — GENTAMICIN SULFATE 80 MG: 80 INJECTION, SOLUTION INTRAVENOUS at 05:09

## 2023-09-21 RX ADMIN — Medication 1000 ML/HR: at 09:09

## 2023-09-21 RX ADMIN — KETAMINE HYDROCHLORIDE 25 MG: 100 INJECTION INTRAMUSCULAR; INTRAVENOUS at 09:09

## 2023-09-21 RX ADMIN — DIPHENHYDRAMINE HYDROCHLORIDE 25 MG: 25 CAPSULE ORAL at 11:09

## 2023-09-21 RX ADMIN — BUPIVACAINE HYDROCHLORIDE 10 MG: 5 INJECTION, SOLUTION EPIDURAL; INTRACAUDAL; PERINEURAL at 08:09

## 2023-09-21 RX ADMIN — FENTANYL CITRATE 100 MCG: 50 INJECTION, SOLUTION INTRAMUSCULAR; INTRAVENOUS at 09:09

## 2023-09-21 RX ADMIN — MORPHINE SULFATE 2 MG: 0.5 INJECTION, SOLUTION EPIDURAL; INTRATHECAL; INTRAVENOUS at 10:09

## 2023-09-21 RX ADMIN — SODIUM CHLORIDE, SODIUM GLUCONATE, SODIUM ACETATE, POTASSIUM CHLORIDE AND MAGNESIUM CHLORIDE: 526; 502; 368; 37; 30 INJECTION, SOLUTION INTRAVENOUS at 09:09

## 2023-09-21 RX ADMIN — DEXTROSE MONOHYDRATE 2 G: 50 INJECTION, SOLUTION INTRAVENOUS at 09:09

## 2023-09-21 RX ADMIN — SODIUM CHLORIDE, SODIUM LACTATE, POTASSIUM CHLORIDE, CALCIUM CHLORIDE AND DEXTROSE MONOHYDRATE: 5; 600; 310; 30; 20 INJECTION, SOLUTION INTRAVENOUS at 09:09

## 2023-09-21 RX ADMIN — AMPICILLIN SODIUM 2 G: 2 INJECTION, POWDER, FOR SOLUTION INTRAMUSCULAR; INTRAVENOUS at 08:09

## 2023-09-21 RX ADMIN — MIDAZOLAM 2 MG: 1 INJECTION INTRAMUSCULAR; INTRAVENOUS at 09:09

## 2023-09-21 RX ADMIN — AMPICILLIN SODIUM 2 G: 2 INJECTION, POWDER, FOR SOLUTION INTRAMUSCULAR; INTRAVENOUS at 01:09

## 2023-09-21 RX ADMIN — KETOROLAC TROMETHAMINE 30 MG: 30 INJECTION, SOLUTION INTRAMUSCULAR; INTRAVENOUS at 10:09

## 2023-09-21 RX ADMIN — KETAMINE HYDROCHLORIDE 10 MG: 100 INJECTION INTRAMUSCULAR; INTRAVENOUS at 10:09

## 2023-09-21 NOTE — PROGRESS NOTES
Upon FHT assessment prior to surgical prep, FHR in 60s.  Pt tilited, LR in progress, O2 applied, anesthesia at bedside and aware of FHR.  Dr. Latham notified.  FHR return to 130-140s.  Abdominal prep started and FHT monitoring discontinued.

## 2023-09-21 NOTE — PROGRESS NOTES
Patient sitting up in bed breastfeeding . Pt c/o feeling lightheaded. VS- O2 saturation 97% on room air, pulse 94, respirations 18.  Asked patient if she would like to  lower HOB, but pt declines.  Pt alert and awake; color pink.  Family at bedside.  Call bell within reach.

## 2023-09-21 NOTE — PROGRESS NOTES
Labor Progress Note        Subjective:      Patient currently doing well without complaints.     Objective:      Temp:  [97.4 °F (36.3 °C)-99 °F (37.2 °C)] 97.8 °F (36.6 °C)  Pulse:  [65-98] 77  Resp:  [10-20] 18  SpO2:  [98 %-100 %] 100 %  BP: ()/(48-74) 111/62  Body mass index is 28.34 kg/m².     General: no acute distress  Electronic Fetal Monitoring:  FHT: 130 bpm, moderate variability, accelerations present, decelerations present   Category: 2                 TOCO: Contractions: irregular, every 15-20 minutes   Cervix:SVE (PeriWATCH)  Dilation (cm): 5  Effacement (%): 85  Station: -2  Examined by:: ЕЛЕНА Calle RN  Chaperone in room: KIMBERLEE Carlos RN  Simplified Harris Score: 7  Dose(units) Oxytocin: *0 sarbjit-units/min      Patient continues to have spontaneous decelerations and occasional prolonged, the fetal heart rate appears to be returning to baseline. I discussed the possibility of  section patient preferred to wait despite minimal cervical change and inability to start Pitocin due to fetal heart rate     Anson Chua

## 2023-09-21 NOTE — TRANSFER OF CARE
"Anesthesia Transfer of Care Note    Patient: Jade Berg    Procedure(s) Performed: Procedure(s) (LRB):   SECTION (N/A)    Patient location: Labor and Delivery    Anesthesia Type: epidural    Transport from OR: Transported from OR on room air with adequate spontaneous ventilation    Post pain: adequate analgesia    Post assessment: no apparent anesthetic complications    Post vital signs: stable    Level of consciousness: awake    Nausea/Vomiting: no nausea/vomiting    Complications: none    Transfer of care protocol was followed      Last vitals:   Visit Vitals  /74   Pulse (!) 115   Temp 36.5 °C (97.7 °F)   Resp 18   Ht 5' 3" (1.6 m)   Wt 72.6 kg (160 lb)   SpO2 100%   Breastfeeding Unknown   BMI 28.34 kg/m²     "

## 2023-09-21 NOTE — PROGRESS NOTES
Labor Progress Note        Subjective:      Patient currently doing well without complaints.     Objective:      Temp:  [98.4 °F (36.9 °C)-99 °F (37.2 °C)] 98.4 °F (36.9 °C)  Pulse:  [65-98] 73  Resp:  [10-20] 19  SpO2:  [98 %-100 %] 100 %  BP: ()/(48-74) 102/51  Body mass index is 28.34 kg/m².     General: no acute distress  Electronic Fetal Monitoring:  FHT: 165 bpm, moderate variability, accelerations present, decelerations absent   Category: 2, overall reassuring                 TOCO: Contractions: irregular, every 8-12 minutes     Cervix:SVE (PeriWATCH)  Dilation (cm): 5  Effacement (%): 85  Station: -2  Examined by:: ЕЛЕНА Calle RN  Chaperone in room: KIMBERLEE Carlos RN  Simplified Harris Score: 7      Prolonged ruptured membranes and fetal tachycardia with overall reassuring fetal heart rate - antibiotics for suspected chorioamnionitis    At next cervical exam if minimal to no change patient amenable to initiate Pitocin     Anson Chua

## 2023-09-21 NOTE — L&D DELIVERY NOTE
[facility] - Labor and Delivery   Section   Operative Note    SUMMARY     Date of Procedure: 2023     Procedure: Primary low transverse  section    Surgeon(s) and Role:     * Doyle Latham MD - Primary    Assisting Surgeon: None    Pre-Operative Diagnosis:   Arrest of dilation, non-reassuring fetal status    Post-Operative Diagnosis:   S/p  section    Anesthesia: Spinal/Epidural           Description of the Findings of the Procedure: Live female infant, delivered weighing 5 lbs 10 oz with loose nuchal x1. Right fallopian tube distended with paratubal cyst. Left fallopian tube surgically absent. Bilateral ovaries normal in appearance. Blood tinged urine noted in vaca catheter after delivery of the fetus. Vaca bulb elevated to bladder dome and bladder carefully inspected without evidence of injury to bladder serosa or muscularis.     Complications: No    Blood Loss: * No values recorded between 2023  9:24 AM and 2023 10:24 AM *     Indication and Consent:   Patient is a 33 y.o. yo   at 40w4d who presented to labor and delivery for primary low transverse  section in the setting of non-reassuring fetal status and arrest of dilation. The patient understood that the risks of  section include, but are not limited, visceral or vascular injury, infection, blood loss with need for transfusion, prolonged hospitalization, and need for reoperation. The patient stated understanding and desired to proceed. All questions were answered.     Procedure:   Patient was taken to the operating room with IV fluids running. Spinal anesthesia was obtained without difficulty. She was positioned in the dorsal supine position. Additional Azithromycin 500 mg administered. Patient previously receiving amp/gent. Vaca catheter was in place to drain the bladder, and Bovie grounding pad placed to the left thigh. FHTs were obtained. She was prepared and draped in the  normal sterile fashion. A time-out was performed.      A pfannenstiel incision was made with the scalpel. The incision was carried down to the fascia sharply. The fascia was incised and extended laterally. The superior aspect of the fascia was grasped with the Kocher clamps, and the underlying rectus muscles were dissected off sharply with the Garland scissors. In a similar fashion, the inferior aspect of the fascia was grasped and elevated, and the underlying rectus and pyramidalis muscles were dissected off sharply. The rectus muscles were  in the midline down to the level of the pubic symphysis. Pre-peritoneal fatty tissue was bluntly dissected to expose the peritoneum. The peritoneum was found to be free of adherent bowel and entered bluntly. The peritoneal incision was extended with blunt traction.      The bladder blade was inserted. A bladder flap was developed with the Metzenbaum scissors. The bladder blade was repositioned to keep the bladder out of the operative field. A transverse incision was made on the lower uterine segment using the scalpel. The incision was extended bluntly with lateral and upward traction. Membranes were ruptured and thick meconium was noted.    The fetus was found to be vertex presentation. The head was elevated out of the pelvis, and with gentle fundal pressure the infant was delivered with no difficulty. Mouth and nose were suctioned with a bulb. The cord was clamped x2 and cut. The infant was handed off to the awaiting  nurse practitioner. Cord segment and cord blood were collected. The placenta was delivered intact by manual massage of the uterus. The uterus was then exteriorized. The inside of the uterus was gently wiped with a lap sponge to assure complete removal of placental membranes. The right angle and inferior edge of the hysterotomy were identified and clamped with Penningtons. The hysterotomy was closed with Chromic 1 suture in a running and locked  "fashion. The incision was inspected and found to be hemostatic. The uterus, tubes, and ovaries were returned to the abdomen. Blood clots and fluid were wiped out of the abdomen and pelvis with irrigation and pool suction. The uterine incision was again inspected and found to be hemostatic.      The peritoneum was closed with  2-0 Chromic suture in a continuous running fashion. The rectus muscles were reapproximated with 2-0 Chromic  suture in a U-stitch. The rectus muscles were inspected, and hemostasis was achieved with a bovie. The fascial layer was closed with Vicryl 1 suture in a continuous running fashion. The subcutaneous layer was irrigated, and hemostasis was achieved with the Bovie. The subcutaneous layer was reapproximated with 2-0 plain gut suture in a simple interrupted fashion. The skin was closed with 3-0 Monocryl KS suture in a subcuticular fashion.      The patient tolerated the procedure well. All counts were correct x2. The patient was taken to the recovery room in a stable condition.      Dr. Latham was present and scrubbed for the entire procedure.      Specimens:   Specimen (24h ago, onward)      None            Condition: Good    VTE Risk Mitigation (From admission, onward)           Ordered     IP VTE LOW RISK PATIENT  Once         23 1544                    Disposition: PACU - hemodynamically stable.    Attestation: Good         Delivery Information for Girl Jade Berg    Birth information:  YOB: 2025   Time of birth: 9:31 AM   Sex: female   Head Delivery Date/Time: 2023  9:31 AM   Delivery type: , Low Transverse   Gestational Age: 40w4d        Delivery Providers    Delivering clinician: Doyle Latham MD   Provider Role    Mirella Sanchez MD Resident              Measurements    Weight: 2551 g  Weight (lbs): 5 lb 10 oz  Length: 48.3 cm  Length (in): 19"  Head circumference: 33 cm         Apgars    Living status: Living  Apgar Component Scores:  " 1 min.:  5 min.:  10 min.:  15 min.:  20 min.:    Skin color:  0  1       Heart rate:  2  2       Reflex irritability:  2  2       Muscle tone:  2  2       Respiratory effort:  2  2       Total:  8  9       Apgars assigned by: KIMBERLEE TY RN/NICU         Operative Delivery    Forceps attempted?: No  Vacuum extractor attempted?: No         Shoulder Dystocia    Shoulder dystocia present?: No           Presentation    Presentation: Vertex           Interventions/Resuscitation    Method: Bulb Suctioning, Tactile Stimulation, NICU Attended       Cord    Vessels: 3 vessels  Complications: None  Cord Clamped Date/Time: 2023  9:31 AM  Cord Blood Disposition: Sent with Baby  Stem Cell Collection (by MD): No       Placenta    Placenta delivery date/time: 2023 0932  Placenta removal: Manual removal  Placenta appearance: Intact  Placenta disposition: Discarded           Labor Events:       labor: No     Labor Onset Date/Time:         Dilation Complete Date/Time:         Start Pushing Date/Time:       Rupture Date/Time: 23  1315         Rupture type: SRM (Spontaneous Rupture)         Fluid Amount:       Fluid Color: Meconium Thin       steroids: None     Antibiotics given for GBS:       Induction:       Indications for induction:        Augmentation:       Indications for augmentation:       Labor complications: Fetal Intolerance     Additional complications:          Cervical ripening:                     Delivery:      Episiotomy:       Indication for Episiotomy:       Perineal Lacerations:   Repaired:      Periurethral Laceration:   Repaired:     Labial Laceration:   Repaired:     Sulcus Laceration:   Repaired:     Vaginal Laceration:   Repaired:     Cervical Laceration:   Repaired:     Repair suture:       Repair # of packets:       Last Value - EBL - Nursing (mL):       Sum - EBL - Nursing (mL): 0     Last Value - EBL - Anesthesia (mL):      Calculated QBL (mL): 660      Vaginal Sweep  Performed:       Surgicount Correct:         Other providers:       Anesthesia    Method: Epidural          Details (if applicable):  Trial of Labor Yes    Categorization: Primary    Priority: Routine   Indications for : Fetal heart rate abnormalities;Labor dystocia/arrest of active phase of labor   Incision Type: low transverse     Additional  information:  Forceps:    Vacuum:    Breech:    Observed anomalies    Other (Comments):         Mirella Sanchez DO  [employer] [facility] PGY-2

## 2023-09-22 LAB
BASOPHILS # BLD AUTO: 0.04 X10(3)/MCL
BASOPHILS NFR BLD AUTO: 0.4 %
EOSINOPHIL # BLD AUTO: 0.03 X10(3)/MCL (ref 0–0.9)
EOSINOPHIL NFR BLD AUTO: 0.3 %
ERYTHROCYTE [DISTWIDTH] IN BLOOD BY AUTOMATED COUNT: 13.2 % (ref 11.5–17)
HCT VFR BLD AUTO: 29.2 % (ref 37–47)
HGB BLD-MCNC: 9.9 G/DL (ref 12–16)
IMM GRANULOCYTES # BLD AUTO: 0.07 X10(3)/MCL (ref 0–0.04)
IMM GRANULOCYTES NFR BLD AUTO: 0.6 %
LYMPHOCYTES # BLD AUTO: 1.37 X10(3)/MCL (ref 0.6–4.6)
LYMPHOCYTES NFR BLD AUTO: 12.4 %
MCH RBC QN AUTO: 33.1 PG (ref 27–31)
MCHC RBC AUTO-ENTMCNC: 33.9 G/DL (ref 33–36)
MCV RBC AUTO: 97.7 FL (ref 80–94)
MONOCYTES # BLD AUTO: 0.65 X10(3)/MCL (ref 0.1–1.3)
MONOCYTES NFR BLD AUTO: 5.9 %
NEUTROPHILS # BLD AUTO: 8.88 X10(3)/MCL (ref 2.1–9.2)
NEUTROPHILS NFR BLD AUTO: 80.4 %
NRBC BLD AUTO-RTO: 0 %
PLATELET # BLD AUTO: 212 X10(3)/MCL (ref 130–400)
PMV BLD AUTO: 9.7 FL (ref 7.4–10.4)
RBC # BLD AUTO: 2.99 X10(6)/MCL (ref 4.2–5.4)
WBC # SPEC AUTO: 11.04 X10(3)/MCL (ref 4.5–11.5)

## 2023-09-22 PROCEDURE — 25000003 PHARM REV CODE 250

## 2023-09-22 PROCEDURE — 63600175 PHARM REV CODE 636 W HCPCS

## 2023-09-22 PROCEDURE — 11000001 HC ACUTE MED/SURG PRIVATE ROOM

## 2023-09-22 PROCEDURE — 85025 COMPLETE CBC W/AUTO DIFF WBC: CPT

## 2023-09-22 RX ADMIN — DOCUSATE SODIUM 200 MG: 100 CAPSULE, LIQUID FILLED ORAL at 08:09

## 2023-09-22 RX ADMIN — SIMETHICONE 80 MG: 80 TABLET, CHEWABLE ORAL at 07:09

## 2023-09-22 RX ADMIN — KETOROLAC TROMETHAMINE 30 MG: 30 INJECTION, SOLUTION INTRAMUSCULAR at 04:09

## 2023-09-22 RX ADMIN — DOCUSATE SODIUM 200 MG: 100 CAPSULE, LIQUID FILLED ORAL at 07:09

## 2023-09-22 RX ADMIN — IBUPROFEN 800 MG: 800 TABLET, FILM COATED ORAL at 11:09

## 2023-09-22 NOTE — PLAN OF CARE
Problem:  Fall Injury Risk  Goal: Absence of Fall, Infant Drop and Related Injury  Outcome: Ongoing, Progressing     Problem: Adult Inpatient Plan of Care  Goal: Plan of Care Review  Outcome: Ongoing, Progressing  Goal: Patient-Specific Goal (Individualized)  Outcome: Ongoing, Progressing  Goal: Absence of Hospital-Acquired Illness or Injury  Outcome: Ongoing, Progressing  Goal: Optimal Comfort and Wellbeing  Outcome: Ongoing, Progressing  Goal: Readiness for Transition of Care  Outcome: Ongoing, Progressing     Problem: Infection  Goal: Absence of Infection Signs and Symptoms  Outcome: Ongoing, Progressing     Problem: Bleeding (Labor)  Goal: Hemostasis  Outcome: Met     Problem: Change in Fetal Wellbeing (Labor)  Goal: Stable Fetal Wellbeing  Outcome: Met     Problem: Delayed Labor Progression (Labor)  Goal: Effective Progression to Delivery  Outcome: Met     Problem: Infection (Labor)  Goal: Absence of Infection Signs and Symptoms  Outcome: Met     Problem: Labor Pain (Labor)  Goal: Acceptable Pain Control  Outcome: Met     Problem: Uterine Tachysystole (Labor)  Goal: Normal Uterine Contraction Pattern  Outcome: Met     Problem: Adjustment to Role Transition (Postpartum  Delivery)  Goal: Successful Maternal Role Transition  Outcome: Ongoing, Progressing     Problem: Bleeding (Postpartum  Delivery)  Goal: Hemostasis  Outcome: Ongoing, Progressing     Problem: Infection (Postpartum  Delivery)  Goal: Absence of Infection Signs and Symptoms  Outcome: Ongoing, Progressing     Problem: Pain (Postpartum  Delivery)  Goal: Acceptable Pain Control  Outcome: Ongoing, Progressing     Problem: Postoperative Nausea and Vomiting (Postpartum  Delivery)  Goal: Nausea and Vomiting Relief  Outcome: Ongoing, Progressing     Problem: Postoperative Urinary Retention (Postpartum  Delivery)  Goal: Effective Urinary Elimination  Outcome: Ongoing, Progressing

## 2023-09-22 NOTE — PROGRESS NOTES
Post Partum  Delivery Progress Note:     Subjective  Jade Berg is 33 y.o.  s/p 1LTCS in the setting of fetal intolerance of labor and arrest of dilation, POD#1.   Patient resting comfortably in bed. No complaints overnight. Pain is well controlled. Lochia similar to her menses. Tolerating regular diet without nausea/vomiting. Voiding clear urine, but not yet passing flatus. Ambulating without difficulty. Infant at bedside. Denies headache, blurry vision, dizziness, chest pain, shortness of breath, RUQ pain, or calf pain.     Objective:  Vitals:    23 1957 23 0027 23 0407 23 0713   BP: 104/69 113/73 111/68 118/73   BP Location:       Pulse: 76 96 98 97   Resp:       Temp: 98 °F (36.7 °C) 98 °F (36.7 °C) 98.1 °F (36.7 °C) 98.6 °F (37 °C)   TempSrc: Oral Oral Oral Oral   SpO2:       Weight:       Height:           General:  Alert and oriented, in no acute distress   Lungs:  Clear to auscultation bilaterally   Heart::  Regular rate and rhythm   Abdomen  Soft, appropriately tender post-op, nondistended, normoactive bowel sounds   Pelvic:  Scant blood present on pad    Uterine Fundus:   Firm, below the umbilicus   Incision: bandage clean, dry, intact   Extremities:  No erythema, cords, or tenderness to palpation in bilateral extremities, trace edema       Intake/Output Summary (Last 24 hours) at 2023 0831  Last data filed at 2023 2220  Gross per 24 hour   Intake 2383.8 ml   Output 1510 ml   Net 873.8 ml       Recent Labs   Lab 23  1553 23  0445   WBC 13.79* 11.04   HGB 14.0 9.9*    212   MCV 95.6* 97.7*       Medications   docusate sodium  200 mg Oral BID    ketorolac  30 mg Intravenous Q8H    Followed by    ibuprofen  800 mg Oral Q8H    mupirocin   Nasal BID    prenatal vitamin  1 tablet Oral Daily      dextrose 5% lactated ringers 175 mL/hr at 23 2220     bisacodyL, carboprost, diphenhydrAMINE, diphenoxylate-atropine 2.5-0.025 mg,  lanolin, magnesium hydroxide 400 mg/5 ml, methylergonovine, miSOPROStoL, miSOPROStoL, morphine, morphine, ondansetron, ondansetron, oxyCODONE-acetaminophen, oxyCODONE-acetaminophen, oxytocin in lactated ringers, oxytocin in lactated ringers, oxytocin, prochlorperazine, senna-docusate 8.6-50 mg, simethicone, sodium chloride 0.9%, tranexamic acid (CYKLOKAPRON) infusion    Assessment/Plan  Jade Berg is a 33 y.o.   s/p 1LTCS in the setting of fetal intolerance of labor and arrest of dilation, POD#1.  Doing well. Pregnancy/Postpartum course uncomplicated     Postoperative state: Regular Diet. Discontinue IVF. Discontinue Vaca. Encourage ambulation once vaca removed. Continue routine postpartum/postop care.   Asymptomatic anemia:   Antepartum H/H 14.0/41.0 --> --> postpartum H/H 9.9/29.2.   No signs/symptoms of anemia this AM, continue to monitor  Feeding: Breastfeeding, continue to encourage  Pain: Scheduled tylenol and ibuprofen, PRN oxycodone for breakthrough pain  Prophylaxis: SCD, Incentive Spirometry  PPBCM: Declines in setting of IVF pregnancy  Dispo: continue to monitor, anticipate discharge to home on POD#3 if meeting all goals.    Will discuss with staff.     Mirella Sanchez, DO  [facility] PGY-2

## 2023-09-22 NOTE — ANESTHESIA POSTPROCEDURE EVALUATION
Anesthesia Post Evaluation    Patient: Jade Berg    Procedure(s) Performed: Procedure(s) (LRB):   SECTION (N/A)    Final Anesthesia Type: epidural      Patient location during evaluation: floor  Patient participation: Yes- Able to Participate  Level of consciousness: awake and alert  Post-procedure vital signs: reviewed and stable  Pain management: adequate  Airway patency: patent    PONV status at discharge: No PONV  Anesthetic complications: no      Respiratory status: unassisted  Hydration status: euvolemic  Follow-up not needed.          Vitals Value Taken Time   /73 23 0713   Temp 36.8 °C (98.3 °F) 23 0900   Pulse 97 23 0713   Resp 18 23 0713   SpO2 97 % 23 1445         Event Time   Out of Recovery 2023 11:30:00         Pain/Hua Score: Pain Rating Prior to Med Admin: 6 (2023 11:51 AM)  Pain Rating Post Med Admin: 0 (2023  8:13 AM)  Hua Score: 10 (2023 11:30 AM)

## 2023-09-22 NOTE — PROGRESS NOTES
Pt c/o shivering; temperature 98.3 orally.  Covered patient with warm blankets and increased room temperature.

## 2023-09-22 NOTE — PLAN OF CARE
Problem: Adult Inpatient Plan of Care  Goal: Plan of Care Review  Outcome: Ongoing, Progressing  Goal: Patient-Specific Goal (Individualized)  Outcome: Ongoing, Progressing  Goal: Optimal Comfort and Wellbeing  Outcome: Ongoing, Progressing  Goal: Readiness for Transition of Care  Outcome: Ongoing, Progressing     Problem: Infection  Goal: Absence of Infection Signs and Symptoms  Outcome: Ongoing, Progressing     Problem: Bleeding (Postpartum  Delivery)  Goal: Hemostasis  Outcome: Ongoing, Progressing     Problem: Pain (Postpartum  Delivery)  Goal: Acceptable Pain Control  Outcome: Ongoing, Progressing     Problem: Infection (Postpartum  Delivery)  Goal: Absence of Infection Signs and Symptoms  Outcome: Ongoing, Progressing     Problem: Infection (Postpartum  Delivery)  Goal: Absence of Infection Signs and Symptoms  Outcome: Ongoing, Progressing     Problem: Postoperative Nausea and Vomiting (Postpartum  Delivery)  Goal: Nausea and Vomiting Relief  Outcome: Ongoing, Progressing     Problem: Postoperative Nausea and Vomiting (Postpartum  Delivery)  Goal: Nausea and Vomiting Relief  Outcome: Ongoing, Progressing     Problem: Postoperative Urinary Retention (Postpartum  Delivery)  Goal: Effective Urinary Elimination  Outcome: Ongoing, Progressing

## 2023-09-23 VITALS
HEART RATE: 88 BPM | OXYGEN SATURATION: 97 % | SYSTOLIC BLOOD PRESSURE: 120 MMHG | TEMPERATURE: 98 F | HEIGHT: 63 IN | BODY MASS INDEX: 28.35 KG/M2 | WEIGHT: 160 LBS | DIASTOLIC BLOOD PRESSURE: 75 MMHG | RESPIRATION RATE: 18 BRPM

## 2023-09-23 PROCEDURE — 25000003 PHARM REV CODE 250

## 2023-09-23 RX ORDER — OXYCODONE AND ACETAMINOPHEN 5; 325 MG/1; MG/1
1 TABLET ORAL EVERY 4 HOURS PRN
Qty: 20 TABLET | Refills: 0 | Status: SHIPPED | OUTPATIENT
Start: 2023-09-23 | End: 2023-10-05

## 2023-09-23 RX ORDER — IBUPROFEN 800 MG/1
800 TABLET ORAL EVERY 8 HOURS PRN
Qty: 30 TABLET | Refills: 0 | Status: SHIPPED | OUTPATIENT
Start: 2023-09-23 | End: 2023-10-05

## 2023-09-23 RX ADMIN — DOCUSATE SODIUM 200 MG: 100 CAPSULE, LIQUID FILLED ORAL at 11:09

## 2023-09-23 RX ADMIN — PRENATAL VITAMINS-IRON FUMARATE 27 MG IRON-FOLIC ACID 0.8 MG TABLET 1 TABLET: at 11:09

## 2023-09-23 RX ADMIN — IBUPROFEN 800 MG: 800 TABLET, FILM COATED ORAL at 11:09

## 2023-09-23 RX ADMIN — IBUPROFEN 800 MG: 800 TABLET, FILM COATED ORAL at 04:09

## 2023-09-23 NOTE — PLAN OF CARE
Problem: Adult Inpatient Plan of Care  Goal: Plan of Care Review  Outcome: Ongoing, Progressing  Goal: Patient-Specific Goal (Individualized)  Outcome: Ongoing, Progressing  Goal: Absence of Hospital-Acquired Illness or Injury  Outcome: Ongoing, Progressing  Goal: Optimal Comfort and Wellbeing  Outcome: Ongoing, Progressing  Goal: Readiness for Transition of Care  Outcome: Ongoing, Progressing     Problem: Infection  Goal: Absence of Infection Signs and Symptoms  Outcome: Ongoing, Progressing     Problem: Adjustment to Role Transition (Postpartum  Delivery)  Goal: Successful Maternal Role Transition  Outcome: Ongoing, Progressing     Problem: Bleeding (Postpartum  Delivery)  Goal: Hemostasis  Outcome: Ongoing, Progressing     Problem: Infection (Postpartum  Delivery)  Goal: Absence of Infection Signs and Symptoms  Outcome: Ongoing, Progressing     Problem: Pain (Postpartum  Delivery)  Goal: Acceptable Pain Control  Outcome: Ongoing, Progressing     Problem: Postoperative Urinary Retention (Postpartum  Delivery)  Goal: Effective Urinary Elimination  Outcome: Ongoing, Progressing

## 2023-09-23 NOTE — DISCHARGE SUMMARY
Delivery Discharge Summary  Obstetrics      Primary OB Clinician:  Roverto Perkins CNM    Admission date: 2023  Discharge date: 2023    Disposition: To home, self care    Discharge Diagnosis List:      Patient Active Problem List   Diagnosis    S/P  section       Procedure:  primary LTCS in setting of fetal intolerance to labor and arrest of dilation    Hospital Course:  Jade Berg is a 33 y.o. now , POD #2 who was admitted on 2023. Patient presented 23 from [facility] where she had been laboring with midwife. Patient was subsequently admitted to labor and delivery unit with signed consents.       Labor course was complicated by fetal intolerance to labor and arrest of dilation, and decision was made to proceed with delivery via  which was performed without complications.    Please see delivery note for further details. Her postpartum course was uncomplicated. On discharge day, patient's pain is controlled with oral pain medications. Pt is tolerating ambulation without SOB or CP, and regular diet without N/V. Reports lochia is mild. Denies any HA, vision changes, F/C, LE swelling. Denies any breast pain/soreness.    Pt in stable condition and ready for discharge. She has been instructed to start and/or continue medications and follow up with her obstetrics provider as listed below.    Pertinent studies:  CBC  Recent Labs   Lab 23  1553 23  0445   WBC 13.79* 11.04   HGB 14.0 9.9*   HCT 41.0 29.2*   MCV 95.6* 97.7*    212        Exam:  Vitals:    23 0758   BP: 120/75   Pulse: 88   Resp: 18   Temp: 98.4 °F (36.9 °C)       General:  Alert and oriented, in no acute distress   Lungs:  Clear to auscultation bilaterally   Heart::  Regular rate and rhythm   Abdomen  Soft, appropriately tender post-op, nondistended, normoactive bowel sounds   Pelvic:  Scant blood present on pad    Uterine Fundus:   Firm, below the umbilicus   Incision:  incision  "clean, dry, intact   Extremities:  No erythema, cords, or tenderness to palpation in bilateral extremities, trace edema           Delivery:    Episiotomy:     Lacerations:     Repair suture:     Repair # of packets:     Blood loss (ml):       Birth information:  YOB: 2025   Time of birth: 9:31 AM   Sex: female   Delivery type: , Low Transverse   Gestational Age: 40w4d     Measurements    Weight: 2551 g  Weight (lbs): 5 lb 10 oz  Length: 48.3 cm  Length (in): 19"  Head circumference: 33 cm         Delivery Clinician: Delivery Providers    Delivering clinician: Doyle Latham MD   Provider Role    Mirella Sanchez MD Resident             Additional  information:  Forceps:    Vacuum:    Breech:    Observed anomalies      Living?:     Apgars    Living status: Living  Apgar Component Scores:  1 min.:  5 min.:  10 min.:  15 min.:  20 min.:    Skin color:  0  1       Heart rate:  2  2       Reflex irritability:  2  2       Muscle tone:  2  2       Respiratory effort:  2  2       Total:  8  9       Apgars assigned by: KIMBERLEE TY RN/NICU         Placenta: Delivered:       appearance    Patient Instructions:   Current Discharge Medication List        START taking these medications    Details   ibuprofen (ADVIL,MOTRIN) 800 MG tablet Take 1 tablet (800 mg total) by mouth every 8 (eight) hours as needed for Pain.  Qty: 30 tablet, Refills: 0      oxyCODONE-acetaminophen (PERCOCET) 5-325 mg per tablet Take 1 tablet by mouth every 4 (four) hours as needed for Pain.  Qty: 20 tablet, Refills: 0    Comments: Quantity prescribed more than 7 day supply? No           CONTINUE these medications which have NOT CHANGED    Details   prenatal vit/iron fum/folic ac (PRENATAL 1+1 ORAL) Take by mouth.             Discharge Procedure Orders   Diet Adult Regular     Pelvic Rest     Notify your health care provider if you experience any of the following:  temperature >100.4     Notify your health care provider if you " experience any of the following:  persistent nausea and vomiting or diarrhea     Notify your health care provider if you experience any of the following:  severe uncontrolled pain     Notify your health care provider if you experience any of the following:  redness, tenderness, or signs of infection (pain, swelling, redness, odor or green/yellow discharge around incision site)     Notify your health care provider if you experience any of the following:  difficulty breathing or increased cough     Notify your health care provider if you experience any of the following:  severe persistent headache     Notify your health care provider if you experience any of the following:  worsening rash     Notify your health care provider if you experience any of the following:  persistent dizziness, light-headedness, or visual disturbances     Notify your health care provider if you experience any of the following:  increased confusion or weakness     Activity as tolerated            Follow-up will be at [facility] clinic in approximately 1-2 weeks for incision check. Pt received prenatal care with Roverto Perkins CNM; will continue to follow for postpartum care. ER precautions were reviewed with the patient and she was given opportunity to ask questions. Stable for discharge.        Jennifer Graham MD  [facility], HO-1

## 2023-09-27 ENCOUNTER — PATIENT OUTREACH (OUTPATIENT)
Dept: ADMINISTRATIVE | Facility: CLINIC | Age: 33
End: 2023-09-27
Payer: COMMERCIAL

## 2023-09-28 NOTE — PHYSICIAN QUERY
PT Name: Jade Berg  MR #: [id number]    DOCUMENTATION CLARIFICATION      CDS/: DENZEL Moore,RNC-MNN       Contact information:[email or web address]    This form is a permanent document in the medical record.      Query Date: 2023    By submitting this query, we are merely seeking further clarification of documentation. Please utilize your independent clinical judgment when addressing the question(s) below.    The Medical Record contains the following:   Indicators  Supporting Clinical Findings Location in Medical Record   X Anemia documented Asymptomatic anemia OB Progress note @833am   X H&H Antepartum H/H 14.0/41.0 --> --> postpartum H/H 9.9/29.2.  OB Progress note @833am    BP                    HR      Bleeding     X Procedure/Surgery Performed/EBL Procedure: Primary low transverse  section    Calculated QBL (mL): 660 L&D Delivery note     Transfusion(s)     X Acute/Chronic illness  s/p 1LTCS in the setting of fetal intolerance of labor and arrest of dilation OB Progress note @833am   X Treatments No signs/symptoms of anemia this AM, continue to monitor OB Progress note @833am    Other       Provider, please further specify the diagnosis of Asymptomatic anemia.  [  x ] Acute blood loss anemia    [   ] Acute blood loss anemia expected post-operatively    [   ] Iron deficiency anemia    [   ] Other: _________________   [   ] Clinically Undetermined     Please document in your progress notes daily for the duration of treatment, until resolved, and include in your discharge summary.    Form No. [id number]

## 2023-09-28 NOTE — PHYSICIAN QUERY
PT Name: Jade Berg  MR #: [id number]     DOCUMENTATION CLARIFICATION      CDS/: DENZEL Moore,RNC-MNN        Contact information:[email or web address]  Withdrawn MCM    This form is a permanent document in the medical record.     Query Date: 2023    Dear Provider,  By submitting this query, we are merely seeking further clarification of documentation.  Please utilize your independent clinical judgment when addressing the question(s) below.     The Medical Record contains the following:    Supporting Clinical Findings Location in Medical Record   Prolonged ruptured membranes and fetal tachycardia with overall reassuring fetal heart rate - antibiotics for suspected chorioamnionitis    OB Progress note @739pm   40w3d regina in labor  Admit to Labor and delivery   IV fluids   No signs of endometritis CBC pending  Suspected meconium on ROM plus examination discussed difficulties with delayed cord clamping in the setting of meconium H&P    primary LTCS in setting of fetal intolerance to labor and arrest of dilation    Labor course was complicated by fetal intolerance to labor and arrest of dilation, and decision was made to proceed with delivery via  which was performed without complications.     Please see delivery note for further details. Her postpartum course was uncomplicated.     Recent Labs   Lab 23  1553 23  0445   WBC 13.79* 11.04    Discharge Summary          Please clarify if the chorioamnionitis diagnosis has been:    [  ] Ruled In   [  ] Ruled In, Now Resolved   [  ] Ruled Out   [  ] Still to be ruled out at the time of discharge   [  ] Other/Clarification of findings (please specify): _______________    [   ] Clinically undetermined     Present on Admission (POA) status:  [  ] Yes (Y)   [  ] No (N)   [  ] Documentation insufficient to determine if condition is POA (U)    [   ] Clinically Undetermined (W)       Please document in  your progress notes daily for the duration of treatment, until resolved, and include in your discharge summary.    Form No. [id number]

## 2023-10-05 ENCOUNTER — OFFICE VISIT (OUTPATIENT)
Dept: FAMILY MEDICINE | Facility: CLINIC | Age: 33
End: 2023-10-05
Payer: COMMERCIAL

## 2023-10-05 VITALS
TEMPERATURE: 98 F | RESPIRATION RATE: 18 BRPM | HEIGHT: 63 IN | HEART RATE: 85 BPM | DIASTOLIC BLOOD PRESSURE: 87 MMHG | WEIGHT: 149.19 LBS | OXYGEN SATURATION: 99 % | BODY MASS INDEX: 26.43 KG/M2 | SYSTOLIC BLOOD PRESSURE: 133 MMHG

## 2023-10-05 DIAGNOSIS — Z98.891 STATUS POST C-SECTION: ICD-10-CM

## 2023-10-05 PROCEDURE — 99213 OFFICE O/P EST LOW 20 MIN: CPT | Mod: PBBFAC

## 2023-10-05 NOTE — PROGRESS NOTES
Postpartum Clinic Follow      [facility]      Jade Berg is a 33 y.o.  S/P  Delivery Post-Operative here at clinic for 2 wk f/u visit.    Pt is s/p LCTS at 40 week 0 day gestation on . Apgars 8/9. Pregnancy was uncomplicated IVF; presented to [facility] from a birth house where she had been laboring with midwife and began having fetal intolerance to labor and arrest of dilation. Decision at admission was made to proceed with  at that time. Patient is doing well today; has support at home from mom and is currently Breastfeeding. Not currently on any pain medications at this time.   She denies depressed mood or suicidal/homicidal ideations. States infant is Home, doing well.     ROS    - see [facility]    Physical Exam      Vitals:    10/05/23 1444   BP: 133/87   Pulse: 85   Resp: 18   Temp: 98.2 °F (36.8 °C)     General:  VSS, afebrile. No acute distress.   Respiratory: Non labored.  No coughing.  Cardiovascular:  No peripheral edema.  DP pulses palpable bilaterally.   ABD: BS+, Soft, nontender  Musculoskeletal:  Full range of motion of all extremities; no joint deformities or edema.   Neurologic:  A&O X 3.    Psychiatric:  Calm, cooperative.  Mood and effect normal.  Responses appropriate.   Skin: incision dry clean and intact. No surrounding erythema, drainage or blood. Area is not tender to palpation     [place]  Depression Scale: 0    Medication List with Changes/Refills   Current Medications    PRENATAL VIT/IRON FUM/FOLIC AC (PRENATAL 1+1 ORAL)    Take by mouth.   Discontinued Medications    IBUPROFEN (ADVIL,MOTRIN) 800 MG TABLET    Take 1 tablet (800 mg total) by mouth every 8 (eight) hours as needed for Pain.    OXYCODONE-ACETAMINOPHEN (PERCOCET) 5-325 MG PER TABLET    Take 1 tablet by mouth every 4 (four) hours as needed for Pain.       Assessment and Plan   Status post  routine postpartum follow-up  Doing well, instructed patient on proper wound care, to keep the incision  area dry and clean   Educated pt on signs and sx of post partum blues, post partum depression and post partum psychosis.   Plans to breastfeed  Contraception: denied at this time. Counseled on importance of contraception and that breastfeeding will not prevent against future pregnancies; she expressed understanding.     Follow up: 4 weeks for routine postpartum visit    PCP: non-[facility]      Vamsi Davey MD  [facility] -II

## 2023-10-12 ENCOUNTER — PATIENT MESSAGE (OUTPATIENT)
Dept: FAMILY MEDICINE | Facility: CLINIC | Age: 33
End: 2023-10-12
Payer: COMMERCIAL

## 2023-12-07 ENCOUNTER — OFFICE VISIT (OUTPATIENT)
Dept: FAMILY MEDICINE | Facility: CLINIC | Age: 33
End: 2023-12-07
Payer: COMMERCIAL

## 2023-12-07 VITALS
BODY MASS INDEX: 27.11 KG/M2 | HEART RATE: 79 BPM | WEIGHT: 153 LBS | SYSTOLIC BLOOD PRESSURE: 138 MMHG | RESPIRATION RATE: 20 BRPM | TEMPERATURE: 98 F | DIASTOLIC BLOOD PRESSURE: 80 MMHG | HEIGHT: 63 IN | OXYGEN SATURATION: 98 %

## 2023-12-07 DIAGNOSIS — R03.0 ELEVATED BLOOD PRESSURE READING: ICD-10-CM

## 2023-12-07 DIAGNOSIS — Z98.891 STATUS POST C-SECTION: Primary | ICD-10-CM

## 2023-12-07 PROCEDURE — 99214 OFFICE O/P EST MOD 30 MIN: CPT | Mod: PBBFAC
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
## 2024-07-03 ENCOUNTER — OFFICE VISIT (OUTPATIENT)
Dept: URGENT CARE | Facility: CLINIC | Age: 34
End: 2024-07-03
Payer: COMMERCIAL

## 2024-07-03 VITALS
BODY MASS INDEX: 27.07 KG/M2 | OXYGEN SATURATION: 99 % | WEIGHT: 152.75 LBS | HEART RATE: 90 BPM | DIASTOLIC BLOOD PRESSURE: 80 MMHG | RESPIRATION RATE: 18 BRPM | HEIGHT: 63 IN | TEMPERATURE: 98 F | SYSTOLIC BLOOD PRESSURE: 125 MMHG

## 2024-07-03 DIAGNOSIS — R52 BODY ACHES: ICD-10-CM

## 2024-07-03 DIAGNOSIS — J06.9 VIRAL URI: Primary | ICD-10-CM

## 2024-07-03 LAB
CTP QC/QA: YES
SARS-COV-2 RDRP RESP QL NAA+PROBE: NEGATIVE

## 2024-07-03 PROCEDURE — 87635 SARS-COV-2 COVID-19 AMP PRB: CPT | Mod: PBBFAC

## 2024-07-03 PROCEDURE — 99213 OFFICE O/P EST LOW 20 MIN: CPT | Mod: S$PBB,,,

## 2024-07-03 PROCEDURE — 99214 OFFICE O/P EST MOD 30 MIN: CPT | Mod: PBBFAC

## 2024-07-03 NOTE — PATIENT INSTRUCTIONS
If a test result is still pending, we will notify you if it is positive.  However, you can see all of your results on your patient portal.    Please drink plenty of fluids.  Please get plenty of rest.  Please return here or go to the Emergency Department for any concerns or worsening of condition.  If you were prescribed antibiotics, please take them to completion.  If you were given wait & see antibiotics, please wait 4-7 days before taking them, and only take them if your symptoms have not improved, or your symptoms have worsened.  If you do begin taking the antibiotics, please take them to completion.  If you were prescribed a narcotic medication, do not drive or operate heavy equipment or machinery while taking these medications.  If you were given a steroid shot in the clinic and have also been given a prescription for a steroid such as Prednisone or a Medrol Dose Pack, please begin taking them tomorrow.  If you do not have Hypertension or any history of palpitations, it is ok to take over the counter Sudafed or Mucinex D or Allegra-D or Claritin-D or Zyrtec-D.  If you do take one of the above, it is ok to combine that with plain over the counter Mucinex or Allegra or Claritin or Zyrtec.  If for example you are taking Zyrtec -D, you can combine that with Mucinex, but not Mucinex-D.  If you are taking Mucinex-D, you can combine that with plain Allegra or Claritin or Zyrtec.   If you do have Hypertension or palpitations, it is safe to take Coricidin HBP for relief of sinus symptoms.  If not allergic and not contraindicated because of your medical conditions, please take over the counter Tylenol (Acetaminophen) and/or Motrin (Ibuprofen) as directed for control of pain and/or fever.  Please follow up with your primary care doctor or specialist as needed.  If you  smoke, please stop smoking.

## 2024-07-03 NOTE — LETTER
[facility]  [address] [address]  Phone: [phone]  [facility]: [email or web address]    Pt Name: Jade Berg  Injury Date:     Employee ID:  Date of First Treatment: 07/03/2024   Company: Networked reference to record [employer]EP [id number][Praveen Grewal's Office      Appointment Time: 01:30 PM Arrived: ***   Provider: NORAH Sherman Time Out:***     Office Treatment:   1. Viral URI    2. Body aches                     Return Appointment: Visit date not found at ***

## 2024-07-03 NOTE — LETTER
July 3, 2024      [facility]  [address] [address]  Phone: [phone]       Patient: Jade Berg   YOB: 1990  Date of Visit: 07/03/2024    To Whom It May Concern:    Marilyn Berg  was at [facility] on 07/03/2024. The patient may return to work/school on 7/4/2024 with no restrictions. If you have any questions or concerns, or if I can be of further assistance, please do not hesitate to contact me.    Sincerely,    NORAH Sherman

## 2024-07-03 NOTE — PROGRESS NOTES
"Subjective:       Patient ID: Jade Berg is a 33 y.o. female.    Vitals:  height is 5' 3" (1.6 m) and weight is 69.3 kg (152 lb 12.5 oz). Her temperature is 98.3 °F (36.8 °C). Her blood pressure is 125/80 and her pulse is 90. Her respiration is 18 and oxygen saturation is 99%.     Chief Complaint: URI (Body aches X last night)    33-year-old female reports to the clinic with complaints of body ache and fever that began last night.  Patient states she is taking over-the-counter Tylenol and ibuprofen with little relief.  Discussed Decadron shot with patient patient is currently breastfeeding and does not want steroid to affect her supply of breast milk.  Patient instructed to take the rest of the day off of work, hydrate, and take over-the-counter medications for fever, congestion, sore throat and pain.  Patient reports she had a fever last night but has been taking over-the-counter  ibuprofen and Tylenol and is unsure she has been febrile since.    All other systems are negative    Chart reviewed    Objective:   Physical Exam   Constitutional: She appears well-developed.  Non-toxic appearance. She does not appear ill. No distress.   HENT:   Head: Normocephalic and atraumatic.   Ears:   Right Ear: Hearing, tympanic membrane, external ear and ear canal normal.   Left Ear: Hearing, tympanic membrane, external ear and ear canal normal.   Nose: Mucosal edema and rhinorrhea present. No purulent discharge. Right sinus exhibits no maxillary sinus tenderness and no frontal sinus tenderness. Left sinus exhibits no maxillary sinus tenderness and no frontal sinus tenderness.   Mouth/Throat: Uvula is midline. Posterior oropharyngeal edema and posterior oropharyngeal erythema present.   Eyes: Right eye exhibits no discharge. Left eye exhibits no discharge. Extraocular movement intact   Neck: Neck supple. No neck rigidity present.   Cardiovascular: Regular rhythm.   Pulmonary/Chest: Effort normal and breath sounds normal. " No respiratory distress. She has no wheezes. She has no rales.   Lymphadenopathy:     She has no cervical adenopathy.   Neurological: She is alert.   Skin: Skin is warm, dry and not diaphoretic.   Psychiatric: Her behavior is normal.   Nursing note and vitals reviewed.      Assessment:     1. Viral URI    2. Body aches          Results for orders placed or performed in visit on 07/03/24   POCT COVID-19 Rapid Screening   Result Value Ref Range    POC Rapid COVID Negative Negative     Acceptable Yes        Plan:     If a test result is still pending, we will notify you if it is positive.  However, you can see all of your results on your patient portal.    Please drink plenty of fluids.  Please get plenty of rest.  Please return here or go to the Emergency Department for any concerns or worsening of condition.  If you were prescribed antibiotics, please take them to completion.  If you were given wait & see antibiotics, please wait 4-7 days before taking them, and only take them if your symptoms have not improved, or your symptoms have worsened.  If you do begin taking the antibiotics, please take them to completion.  If you were prescribed a narcotic medication, do not drive or operate heavy equipment or machinery while taking these medications.  If you were given a steroid shot in the clinic and have also been given a prescription for a steroid such as Prednisone or a Medrol Dose Pack, please begin taking them tomorrow.  If you do not have Hypertension or any history of palpitations, it is ok to take over the counter Sudafed or Mucinex D or Allegra-D or Claritin-D or Zyrtec-D.  If you do take one of the above, it is ok to combine that with plain over the counter Mucinex or Allegra or Claritin or Zyrtec.  If for example you are taking Zyrtec -D, you can combine that with Mucinex, but not Mucinex-D.  If you are taking Mucinex-D, you can combine that with plain Allegra or Claritin or Zyrtec.   If you do  have Hypertension or palpitations, it is safe to take Coricidin HBP for relief of sinus symptoms.  If not allergic and not contraindicated because of your medical conditions, please take over the counter Tylenol (Acetaminophen) and/or Motrin (Ibuprofen) as directed for control of pain and/or fever.  Please follow up with your primary care doctor or specialist as needed.  If you  smoke, please stop smoking.      Viral URI    Body aches  -     POCT COVID-19 Rapid Screening        Please note: This chart was completed via voice to text dictation. It may contain typographical/word recognition errors. If there are any questions, please contact the provider for final clarification.

## (undated) DEVICE — SEE MEDLINE ITEM 157117

## (undated) DEVICE — PAD SANITARY OB STERILE

## (undated) DEVICE — PAD UNDERPAD 30X30

## (undated) DEVICE — SOL WATER STRL IRR 1000ML

## (undated) DEVICE — ELECTRODE REM PLYHSV RETURN 9

## (undated) DEVICE — SUT CHROMIC 2-0 CT1 36IN BR

## (undated) DEVICE — SUT 1 36IN CHROMIC GUT CTX

## (undated) DEVICE — SOL NACL IRR 1000ML BTL

## (undated) DEVICE — CAP BABY BEANIE

## (undated) DEVICE — BINDER ABDOM 4PANEL 12IN LG/XL

## (undated) DEVICE — BULB SYRINGE EAR IRRIGATION

## (undated) DEVICE — SUT MONOCRYL 3-0 KS UND MON

## (undated) DEVICE — SEE MEDLINE ITEM 156931

## (undated) DEVICE — SUT CTD VICRYL 1 UND BR

## (undated) DEVICE — Device

## (undated) DEVICE — SUT PLAIN MONO 2-0 XLH 27IN

## (undated) DEVICE — SET HOTLINE 3 FLUID/BLD WARM

## (undated) DEVICE — MATTRESS HOVERMAT TRNSF 34X78